# Patient Record
Sex: MALE | Race: WHITE
[De-identification: names, ages, dates, MRNs, and addresses within clinical notes are randomized per-mention and may not be internally consistent; named-entity substitution may affect disease eponyms.]

---

## 2018-07-17 NOTE — DIAGNOSTIC IMAGING REPORT
PROCEDURE:

Frontal and lateral views of the chest.

 

COMPARISON: Patients [facility], , CHEST 2 VIEWS, 10/18/2016, 

10:54.

 

INDICATIONS:   PREOPERATIVE CHEST XRAY FOR PROSTATE SURGERY

     

FINDINGS:

Lines/tubes:  None.

 

Lungs:  The lungs are well inflated and clear. There is no evidence of 

pneumonia or pulmonary edema.

 

Pleura:  There is no pleural effusion or pneumothorax.

 

Heart and mediastinum: Cardiac silhouette is unremarkable. Pulmonary 

vasculature is normal. Atherosclerotic calcification aortic arch. 

 

Bones:  No acute bony abnormality. Degenerative changes in the thoracic 

spine.

 

 

IMPRESSION: 

 

1.  No acute cardiopulmonary disease.

 

 

Nilton Reynaga M.D.  

Dictated by:  Nilton Reynaga M.D. on 7/17/2018 at 14:10     

Electronically approved by:  Nilton Reynaga M.D. on 7/17/2018 at 

14:10

## 2018-07-18 ENCOUNTER — HOSPITAL ENCOUNTER (OUTPATIENT)
Dept: HOSPITAL 88 - OR | Age: 83
Discharge: HOME | End: 2018-07-18
Attending: SPECIALIST
Payer: MEDICARE

## 2018-07-18 DIAGNOSIS — I48.91: ICD-10-CM

## 2018-07-18 DIAGNOSIS — I10: ICD-10-CM

## 2018-07-18 DIAGNOSIS — E11.9: ICD-10-CM

## 2018-07-18 DIAGNOSIS — N35.8: Primary | ICD-10-CM

## 2018-07-18 DIAGNOSIS — N13.8: ICD-10-CM

## 2018-07-18 DIAGNOSIS — C61: ICD-10-CM

## 2018-07-18 PROCEDURE — 52281 CYSTOSCOPY AND TREATMENT: CPT

## 2018-07-18 PROCEDURE — 93005 ELECTROCARDIOGRAM TRACING: CPT

## 2018-07-18 PROCEDURE — 51702 INSERT TEMP BLADDER CATH: CPT

## 2018-07-18 PROCEDURE — 71046 X-RAY EXAM CHEST 2 VIEWS: CPT

## 2018-07-18 PROCEDURE — 82948 REAGENT STRIP/BLOOD GLUCOSE: CPT

## 2018-07-18 PROCEDURE — 36415 COLL VENOUS BLD VENIPUNCTURE: CPT

## 2018-07-18 PROCEDURE — 76000 FLUOROSCOPY <1 HR PHYS/QHP: CPT

## 2018-07-18 NOTE — OPERATIVE REPORT
DATE OF PROCEDURE:  July 18, 2018



PREOPERATIVE DIAGNOSES

1. History of adenocarcinoma of the prostate, post radical 

prostatectomy.

2. Bulbous urethral stricture.

3. Bladder neck contraction.



POSTOPERATIVE DIAGNOSES

1. History of adenocarcinoma of the prostate, post radical 

prostatectomy.

2. Bulbous urethral stricture.

3. Bladder neck contraction.



OPERATIONS

1. Cystourethroscopy.

2. Balloon dilation of the urethral stricture.

3. Balloon dilation of the bladder neck.

4. Luevano catheter placement, #22-Occitan.



ANESTHETIST:  Dr. Mccabe.



ANESTHETIC:  General.



INDICATIONS:  Mr. Melendez is an 85-year-old male, who is very well known to 

me with a history of adenocarcinoma of the prostate post radical 

prostatectomy with a history of recurrent urethral stricture and bladder 

neck contraction and obstruction.  He presented at this time with 

increasing obstruction.



PROCEDURE IN DETAIL:  This patient was placed on the table in the lithotomy 

position and was prepped and draped in a sterile manner after satisfactory 

anesthesia.



A #22-Occitan cystoscope was used and passed through the urethra to the 

distal bulbous urethra where the stricture was.



At this point, a 0.35 guidewire was then passed through the working channel 

of the cystoscope through the stricture area to the bladder.



The urethral balloon with 18 cm balloon was then passed over the guidewire 

through the cystoscope all the way to the level of the stricture and then 

with some manipulation, I was able to pass the balloon through.  Once this 

was done, the urethral stricture was dilated with the balloon up to 2 

atmospheres, fully dilated for 5 minutes.  The balloon was deflated.



The balloon was then passed over the guidewire through the bladder neck 

area and inflated again and kept inflated again for 5 minutes.  The balloon 

was deflated and removed.



Cystoscopy was then performed and the bladder neck was now widely opened.



Cystoscopy was then performed using both right-angle and the foroblique 

lens and it was noted that the bladder mucosa was normal.  There was no 

evidence of gross tumor, pathology, or any papillary lesion.  Both urethral 

orifices were seen.  The bladder was drained, cystoscope removed, and a 

#22-Occitan Luevano catheter was placed and will be left there for 1 week.



He is to return to the office in 1 week and at that time, the Luevano 

catheter will be removed.





DISCHARGE MEDICATIONS

1. Cipro 250 mg 1 twice a day for 1 week.

2. Ultracet tablet 1 every 8 hours to 12 hours p.r.n. and was given 15.









DD:  07/18/2018 10:22

DT:  07/18/2018 11:14

Job#:  [id number]